# Patient Record
Sex: MALE | Race: ASIAN | NOT HISPANIC OR LATINO | URBAN - METROPOLITAN AREA
[De-identification: names, ages, dates, MRNs, and addresses within clinical notes are randomized per-mention and may not be internally consistent; named-entity substitution may affect disease eponyms.]

---

## 2024-01-17 ENCOUNTER — EMERGENCY (EMERGENCY)
Facility: HOSPITAL | Age: 64
LOS: 1 days | Discharge: HOME | End: 2024-01-17
Attending: STUDENT IN AN ORGANIZED HEALTH CARE EDUCATION/TRAINING PROGRAM | Admitting: STUDENT IN AN ORGANIZED HEALTH CARE EDUCATION/TRAINING PROGRAM
Payer: MEDICAID

## 2024-01-17 VITALS
SYSTOLIC BLOOD PRESSURE: 124 MMHG | HEART RATE: 75 BPM | OXYGEN SATURATION: 99 % | DIASTOLIC BLOOD PRESSURE: 78 MMHG | TEMPERATURE: 98 F | RESPIRATION RATE: 18 BRPM

## 2024-01-17 VITALS
HEART RATE: 77 BPM | TEMPERATURE: 98 F | DIASTOLIC BLOOD PRESSURE: 80 MMHG | OXYGEN SATURATION: 100 % | RESPIRATION RATE: 18 BRPM | SYSTOLIC BLOOD PRESSURE: 164 MMHG

## 2024-01-17 PROCEDURE — 73564 X-RAY EXAM KNEE 4 OR MORE: CPT | Mod: 26,LT

## 2024-01-17 PROCEDURE — 93010 ELECTROCARDIOGRAM REPORT: CPT

## 2024-01-17 PROCEDURE — 73110 X-RAY EXAM OF WRIST: CPT | Mod: 26,RT

## 2024-01-17 PROCEDURE — 70450 CT HEAD/BRAIN W/O DYE: CPT | Mod: 26,MA

## 2024-01-17 PROCEDURE — 99285 EMERGENCY DEPT VISIT HI MDM: CPT

## 2024-01-17 PROCEDURE — 70486 CT MAXILLOFACIAL W/O DYE: CPT | Mod: 26,MA

## 2024-01-17 PROCEDURE — 72125 CT NECK SPINE W/O DYE: CPT | Mod: 26,MA

## 2024-01-17 RX ORDER — ACETAMINOPHEN 500 MG
650 TABLET ORAL ONCE
Refills: 0 | Status: COMPLETED | OUTPATIENT
Start: 2024-01-17 | End: 2024-01-17

## 2024-01-17 RX ADMIN — Medication 650 MILLIGRAM(S): at 12:00

## 2024-01-17 NOTE — ED PROVIDER NOTE - CLINICAL SUMMARY MEDICAL DECISION MAKING FREE TEXT BOX
63-year-old male with past medical history of MI, CAD with stents on aspirin and Plavix, hypertension, presents to ED status post mechanical fall this morning.  Patient states he was walking outside and slipped on ice and fell forward hitting his head on the ground with LOC.  Patient states he was able to get up himself.  Patient states he had a laceration repaired on his forehead at urgent care today and sent to ED for CT and evaluation. Given tetanus shot today at .  Patient admits to mild posterior headache and neck pain, right wrist pain and left knee pain. no midline spinal ttp, no neuro deficits, sutures c/d/i. plan to check ct head, c-spine, ct max/face, right wrist XR and left knee XR.

## 2024-01-17 NOTE — ED ADULT TRIAGE NOTE - CHIEF COMPLAINT QUOTE
Pt c/o slip and fall on ice with head injury. +LOC approx 1 min.  Takes ASA daily. Also c/o BL jaw pain and L knee pain. Arrives with bandage to mid forehead and bandaid to bridge of nose. , bleeding controlled. Denies dizziness, blurry vision, upper/lower back pain, neck pain on palpation.

## 2024-01-17 NOTE — ED ADULT NURSE REASSESSMENT NOTE - NS ED NURSE REASSESS COMMENT FT1
Report Received from RN. Pt resting comfortably in spot 10b. Pt endorses relief of pain at this time. Airway is patent, respirations are even and unlabored. Plan of care ongoing, safety maintained.

## 2024-01-17 NOTE — ED PROVIDER NOTE - NSFOLLOWUPINSTRUCTIONS_ED_ALL_ED_FT
See your primary care doctor within 24-48hours. Bring copies of all of your test results with you. Take Motrin 600mg every 8hrs with food for pain.     Return to urgent care or emergency department for suture removal in 7 days.      If you develop a worsening headache, vision changes, balance problems, loss of sensation in your extremities, severe weakness or any other concerns, return to the emergency room.

## 2024-01-17 NOTE — ED ADULT NURSE NOTE - OBJECTIVE STATEMENT
Patient is a 64 yo male, phx CAD w/ stents, HTN, HLD, presenting with head injury after slip and fall onto ice this morning. AAOx4, no signs of distress but does have a large hematoma to forehead, bleeding controlled at this time, reports LOC, only on aspirin for anticoagulation. Endorsing headache, denies dizziness, blurry vision, nausea. Able to walk. No other complaints. Medicated for pain per orders. Pending CT results. Fall precautions maintained.

## 2024-01-17 NOTE — ED PROVIDER NOTE - CARE PLAN
Principal Discharge DX:	Head trauma  Secondary Diagnosis:	Neck pain  Secondary Diagnosis:	Left knee pain   1

## 2024-01-17 NOTE — ED ADULT NURSE NOTE - NSFALLRISKINTERV_ED_ALL_ED

## 2024-01-17 NOTE — ED PROVIDER NOTE - ATTENDING APP SHARED VISIT CONTRIBUTION OF CARE
I (Law) agree with above, I performed a history and physical. Counseled edmar medical staff, physician assistant, and/or medical student on medical decision making as documented. Medical decisions and treatment interventions were made in real time during the patient encounter. Additionally and/or with the following exceptions: 63-year-old male past medical history coronary artery disease on Plavix, hypertension presents with mechanical fall.  Patient slipped on ice.  Fell forward hit his head.  Did not lose consciousness.  Went to urgent care where he was given a tetanus booster and referred to ED for CT imaging.  Comprehensive trauma exam was positive for abrasion to forehead as well as nose.  There is no laceration to repair.  Also had tenderness with palpation of the right wrist and the left knee.  Given antiplatelet and mechanism of injury patient was ordered for CT imaging of the head, C-spine, max face and x-rays to rule out fracture.  Radiology studies were negative for acute traumatic findings.  Patient is stable for discharge.

## 2024-01-17 NOTE — ED PROVIDER NOTE - ENMT, MLM
Airway patent, Nasal mucosa clear. Mouth with normal mucosa. Throat has no vesicles, no oropharyngeal exudates and uvula is midline. Right forehead laceration repaired 10 sutures C/D/I. No active bleeding. Abrasion to bridge of nose. +b/l jaw ttp and nasal bone ttp. No gross nasal deformity.

## 2024-01-17 NOTE — ED PROVIDER NOTE - PATIENT PORTAL LINK FT
You can access the FollowMyHealth Patient Portal offered by [facility] by registering at the following website: [email or web address]. By joining [facility]’s FollowMyHealth portal, you will also be able to view your health information using other applications (apps) compatible with our system.

## 2024-01-17 NOTE — ED PROVIDER NOTE - OBJECTIVE STATEMENT
63-year-old male with past medical history of MI, CAD with stents on aspirin and Plavix, hypertension, presents to ED status post mechanical fall this morning.  Patient states he was walking outside and slipped on ice and fell forward hitting his head on the ground with LOC.  Patient states he was able to get up himself.  Patient states he had a laceration repaired on his forehead at urgent care today and sent to ED for CT and evaluation. Given tetanus shot today at .  Patient admits to mild posterior headache and neck pain, right wrist pain and left knee pain.  Patient is ambulatory.  Patient denies any chest pain shortness of breath abdominal pain nausea vomiting vision changes dizziness weakness numbness tingling slurred speech facial droop.

## 2024-01-17 NOTE — ED PROVIDER NOTE - NS ED ATTENDING STATEMENT MOD
Detail Level: Detailed This was a shared visit with the CORNELIUS. I reviewed and verified the documentation.

## 2024-01-17 NOTE — ED PROVIDER NOTE - MUSCULOSKELETAL MINIMAL EXAM
no snuffbox R hand ttp. +right medial wrist ttp. full rom. pulse 2+/atraumatic/normal range of motion

## 2024-12-04 PROBLEM — I21.9 ACUTE MYOCARDIAL INFARCTION, UNSPECIFIED: Chronic | Status: ACTIVE | Noted: 2024-01-17

## 2024-12-04 PROBLEM — E78.5 HYPERLIPIDEMIA, UNSPECIFIED: Chronic | Status: ACTIVE | Noted: 2024-01-17

## 2024-12-04 PROBLEM — I10 ESSENTIAL (PRIMARY) HYPERTENSION: Chronic | Status: ACTIVE | Noted: 2024-01-17

## 2024-12-19 ENCOUNTER — NON-APPOINTMENT (OUTPATIENT)
Age: 64
End: 2024-12-19

## 2024-12-19 ENCOUNTER — APPOINTMENT (OUTPATIENT)
Dept: CARDIOLOGY | Facility: CLINIC | Age: 64
End: 2024-12-19
Payer: MEDICAID

## 2024-12-19 ENCOUNTER — INPATIENT (INPATIENT)
Facility: HOSPITAL | Age: 64
LOS: 0 days | Discharge: HOME | DRG: 322 | End: 2024-12-20
Attending: STUDENT IN AN ORGANIZED HEALTH CARE EDUCATION/TRAINING PROGRAM | Admitting: STUDENT IN AN ORGANIZED HEALTH CARE EDUCATION/TRAINING PROGRAM
Payer: MEDICAID

## 2024-12-19 VITALS
BODY MASS INDEX: 26.99 KG/M2 | HEIGHT: 65 IN | HEART RATE: 79 BPM | SYSTOLIC BLOOD PRESSURE: 121 MMHG | WEIGHT: 162 LBS | DIASTOLIC BLOOD PRESSURE: 77 MMHG | OXYGEN SATURATION: 100 %

## 2024-12-19 VITALS
HEIGHT: 65 IN | OXYGEN SATURATION: 98 % | DIASTOLIC BLOOD PRESSURE: 96 MMHG | SYSTOLIC BLOOD PRESSURE: 157 MMHG | RESPIRATION RATE: 20 BRPM | WEIGHT: 162.04 LBS | TEMPERATURE: 98 F | HEART RATE: 91 BPM

## 2024-12-19 DIAGNOSIS — E11.9 TYPE 2 DIABETES MELLITUS W/OUT COMPLICATIONS: ICD-10-CM

## 2024-12-19 DIAGNOSIS — Z78.9 OTHER SPECIFIED HEALTH STATUS: ICD-10-CM

## 2024-12-19 DIAGNOSIS — R53.83 OTHER FATIGUE: ICD-10-CM

## 2024-12-19 DIAGNOSIS — F17.210 NICOTINE DEPENDENCE, CIGARETTES, UNCOMPLICATED: ICD-10-CM

## 2024-12-19 DIAGNOSIS — Z83.3 FAMILY HISTORY OF DIABETES MELLITUS: ICD-10-CM

## 2024-12-19 DIAGNOSIS — E78.5 HYPERLIPIDEMIA, UNSPECIFIED: ICD-10-CM

## 2024-12-19 DIAGNOSIS — R07.9 CHEST PAIN, UNSPECIFIED: ICD-10-CM

## 2024-12-19 DIAGNOSIS — Z83.42 FAMILY HISTORY OF FAMILIAL HYPERCHOLESTEROLEMIA: ICD-10-CM

## 2024-12-19 DIAGNOSIS — I10 ESSENTIAL (PRIMARY) HYPERTENSION: ICD-10-CM

## 2024-12-19 DIAGNOSIS — I25.118 ATHEROSCLEROTIC HEART DISEASE OF NATIVE CORONARY ARTERY WITH OTHER FORMS OF ANGINA PECTORIS: ICD-10-CM

## 2024-12-19 PROBLEM — Z00.00 ENCOUNTER FOR PREVENTIVE HEALTH EXAMINATION: Status: ACTIVE | Noted: 2024-12-19

## 2024-12-19 LAB
ALBUMIN SERPL ELPH-MCNC: 5.5 G/DL — HIGH (ref 3.3–5)
ALP SERPL-CCNC: 56 U/L — SIGNIFICANT CHANGE UP (ref 40–120)
ALT FLD-CCNC: 30 U/L — SIGNIFICANT CHANGE UP (ref 10–45)
ANION GAP SERPL CALC-SCNC: 11 MMOL/L — SIGNIFICANT CHANGE UP (ref 5–17)
APTT BLD: 37.2 SEC — HIGH (ref 24.5–35.6)
AST SERPL-CCNC: 24 U/L — SIGNIFICANT CHANGE UP (ref 10–40)
BASOPHILS # BLD AUTO: 0.07 K/UL — SIGNIFICANT CHANGE UP (ref 0–0.2)
BASOPHILS NFR BLD AUTO: 0.8 % — SIGNIFICANT CHANGE UP (ref 0–2)
BILIRUB SERPL-MCNC: 0.4 MG/DL — SIGNIFICANT CHANGE UP (ref 0.2–1.2)
BUN SERPL-MCNC: 14 MG/DL — SIGNIFICANT CHANGE UP (ref 7–23)
CALCIUM SERPL-MCNC: 10.7 MG/DL — HIGH (ref 8.4–10.5)
CHLORIDE SERPL-SCNC: 105 MMOL/L — SIGNIFICANT CHANGE UP (ref 96–108)
CO2 SERPL-SCNC: 26 MMOL/L — SIGNIFICANT CHANGE UP (ref 22–31)
CREAT SERPL-MCNC: 1.18 MG/DL — SIGNIFICANT CHANGE UP (ref 0.5–1.3)
EGFR: 69 ML/MIN/1.73M2 — SIGNIFICANT CHANGE UP
EOSINOPHIL # BLD AUTO: 0.25 K/UL — SIGNIFICANT CHANGE UP (ref 0–0.5)
EOSINOPHIL NFR BLD AUTO: 2.8 % — SIGNIFICANT CHANGE UP (ref 0–6)
GLUCOSE BLDC GLUCOMTR-MCNC: 140 MG/DL — HIGH (ref 70–99)
GLUCOSE SERPL-MCNC: 104 MG/DL — HIGH (ref 70–99)
HCT VFR BLD CALC: 46.2 % — SIGNIFICANT CHANGE UP (ref 39–50)
HGB BLD-MCNC: 15.3 G/DL — SIGNIFICANT CHANGE UP (ref 13–17)
IMM GRANULOCYTES NFR BLD AUTO: 0.2 % — SIGNIFICANT CHANGE UP (ref 0–0.9)
INR BLD: 0.98 RATIO — SIGNIFICANT CHANGE UP (ref 0.85–1.16)
LYMPHOCYTES # BLD AUTO: 4.13 K/UL — HIGH (ref 1–3.3)
LYMPHOCYTES # BLD AUTO: 45.6 % — HIGH (ref 13–44)
MCHC RBC-ENTMCNC: 30.2 PG — SIGNIFICANT CHANGE UP (ref 27–34)
MCHC RBC-ENTMCNC: 33.1 G/DL — SIGNIFICANT CHANGE UP (ref 32–36)
MCV RBC AUTO: 91.1 FL — SIGNIFICANT CHANGE UP (ref 80–100)
MONOCYTES # BLD AUTO: 0.57 K/UL — SIGNIFICANT CHANGE UP (ref 0–0.9)
MONOCYTES NFR BLD AUTO: 6.3 % — SIGNIFICANT CHANGE UP (ref 2–14)
NEUTROPHILS # BLD AUTO: 4.02 K/UL — SIGNIFICANT CHANGE UP (ref 1.8–7.4)
NEUTROPHILS NFR BLD AUTO: 44.3 % — SIGNIFICANT CHANGE UP (ref 43–77)
NRBC # BLD: 0 /100 WBCS — SIGNIFICANT CHANGE UP (ref 0–0)
NT-PROBNP SERPL-SCNC: <36 PG/ML — SIGNIFICANT CHANGE UP (ref 0–300)
PLATELET # BLD AUTO: 316 K/UL — SIGNIFICANT CHANGE UP (ref 150–400)
POTASSIUM SERPL-MCNC: 4 MMOL/L — SIGNIFICANT CHANGE UP (ref 3.5–5.3)
POTASSIUM SERPL-SCNC: 4 MMOL/L — SIGNIFICANT CHANGE UP (ref 3.5–5.3)
PROT SERPL-MCNC: 8.2 G/DL — SIGNIFICANT CHANGE UP (ref 6–8.3)
PROTHROM AB SERPL-ACNC: 11.3 SEC — SIGNIFICANT CHANGE UP (ref 9.9–13.4)
RBC # BLD: 5.07 M/UL — SIGNIFICANT CHANGE UP (ref 4.2–5.8)
RBC # FLD: 13.8 % — SIGNIFICANT CHANGE UP (ref 10.3–14.5)
SODIUM SERPL-SCNC: 142 MMOL/L — SIGNIFICANT CHANGE UP (ref 135–145)
TROPONIN T, HIGH SENSITIVITY RESULT: 10 NG/L — SIGNIFICANT CHANGE UP (ref 0–51)
WBC # BLD: 9.06 K/UL — SIGNIFICANT CHANGE UP (ref 3.8–10.5)
WBC # FLD AUTO: 9.06 K/UL — SIGNIFICANT CHANGE UP (ref 3.8–10.5)

## 2024-12-19 PROCEDURE — 99291 CRITICAL CARE FIRST HOUR: CPT

## 2024-12-19 PROCEDURE — 99205 OFFICE O/P NEW HI 60 MIN: CPT

## 2024-12-19 PROCEDURE — 99152 MOD SED SAME PHYS/QHP 5/>YRS: CPT

## 2024-12-19 PROCEDURE — 92978 ENDOLUMINL IVUS OCT C 1ST: CPT | Mod: 26,LD

## 2024-12-19 PROCEDURE — 92928 PRQ TCAT PLMT NTRAC ST 1 LES: CPT | Mod: LD

## 2024-12-19 PROCEDURE — 71045 X-RAY EXAM CHEST 1 VIEW: CPT | Mod: 26

## 2024-12-19 PROCEDURE — 99253 IP/OBS CNSLTJ NEW/EST LOW 45: CPT | Mod: GC

## 2024-12-19 PROCEDURE — 93010 ELECTROCARDIOGRAM REPORT: CPT

## 2024-12-19 PROCEDURE — 93458 L HRT ARTERY/VENTRICLE ANGIO: CPT | Mod: 26,59

## 2024-12-19 RX ORDER — AMLODIPINE BESYLATE 2.5 MG/1
2.5 TABLET ORAL
Qty: 90 | Refills: 1 | Status: ACTIVE | COMMUNITY

## 2024-12-19 RX ORDER — METOPROLOL TARTRATE 100 MG/1
25 TABLET, FILM COATED ORAL EVERY 12 HOURS
Refills: 0 | Status: DISCONTINUED | OUTPATIENT
Start: 2024-12-19 | End: 2024-12-20

## 2024-12-19 RX ORDER — CLOPIDOGREL BISULFATE 75 MG/1
75 TABLET, FILM COATED ORAL
Refills: 0 | Status: ACTIVE | COMMUNITY

## 2024-12-19 RX ORDER — FENOFIBRATE 145 MG/1
145 TABLET, COATED ORAL
Qty: 90 | Refills: 1 | Status: ACTIVE | COMMUNITY

## 2024-12-19 RX ORDER — AMLODIPINE BESYLATE 10 MG/1
2.5 TABLET ORAL DAILY
Refills: 0 | Status: DISCONTINUED | OUTPATIENT
Start: 2024-12-19 | End: 2024-12-20

## 2024-12-19 RX ORDER — GLUCAGON INJECTION, SOLUTION 0.5 MG/.1ML
1 INJECTION, SOLUTION SUBCUTANEOUS ONCE
Refills: 0 | Status: DISCONTINUED | OUTPATIENT
Start: 2024-12-19 | End: 2024-12-20

## 2024-12-19 RX ORDER — LISINOPRIL 20 MG/1
5 TABLET ORAL DAILY
Refills: 0 | Status: DISCONTINUED | OUTPATIENT
Start: 2024-12-19 | End: 2024-12-20

## 2024-12-19 RX ORDER — ASPIRIN ENTERIC COATED TABLETS 81 MG 81 MG/1
81 TABLET, DELAYED RELEASE ORAL
Qty: 90 | Refills: 1 | Status: ACTIVE | COMMUNITY

## 2024-12-19 RX ORDER — CLOPIDOGREL 75 MG/1
75 TABLET, FILM COATED ORAL DAILY
Refills: 0 | Status: DISCONTINUED | OUTPATIENT
Start: 2024-12-19 | End: 2024-12-20

## 2024-12-19 RX ORDER — 0.9 % SODIUM CHLORIDE 0.9 %
1000 INTRAVENOUS SOLUTION INTRAVENOUS
Refills: 0 | Status: DISCONTINUED | OUTPATIENT
Start: 2024-12-19 | End: 2024-12-20

## 2024-12-19 RX ORDER — LISINOPRIL 5 MG/1
5 TABLET ORAL DAILY
Refills: 0 | Status: ACTIVE | COMMUNITY

## 2024-12-19 RX ORDER — ATORVASTATIN CALCIUM 80 MG/1
80 TABLET, FILM COATED ORAL
Qty: 90 | Refills: 1 | Status: ACTIVE | COMMUNITY

## 2024-12-19 RX ORDER — METOPROLOL TARTRATE 25 MG/1
25 TABLET ORAL
Qty: 90 | Refills: 0 | Status: ACTIVE | COMMUNITY

## 2024-12-19 RX ORDER — CLOPIDOGREL 75 MG/1
1 TABLET, FILM COATED ORAL
Qty: 90 | Refills: 3
Start: 2024-12-19 | End: 2025-12-13

## 2024-12-19 RX ORDER — UBIDECARENONE/VIT E ACET 100MG-5
25 MCG CAPSULE ORAL
Refills: 0 | Status: ACTIVE | COMMUNITY

## 2024-12-19 RX ORDER — EZETIMIBE 10 MG/1
10 TABLET ORAL
Qty: 90 | Refills: 1 | Status: ACTIVE | COMMUNITY

## 2024-12-19 RX ADMIN — Medication 80 MILLIGRAM(S): at 21:53

## 2024-12-19 RX ADMIN — METOPROLOL TARTRATE 25 MILLIGRAM(S): 100 TABLET, FILM COATED ORAL at 21:53

## 2024-12-19 NOTE — CONSULT NOTE ADULT - SUBJECTIVE AND OBJECTIVE BOX
Cardiology Consult Note  ------------------------------------------------------------------------------------------  SUBJECTIVE:   64 year-old male with PMH HTN, HLD, T2DM, CAD s/p PCI in 2014 for ACS (per patient report, records not available) who presented earlier today as a new patient to cardiology clinic establish care. Was seen by Dr. Pollack. There, he reported gradual onset of exertional chest tightness, shortness of breath and leg claudication starting around 4-5 years ago, which has progressively worsened. About 1 month ago, he would notice symptoms after as little as 1-2 blocks. Symptoms are relieved by rest. He had a resting episode of severe chest discomfort 1 month ago which felt like his prior MI, which prompted him to take a sublingual nitroglycerin. This alleviated his symptoms. Since then patient has not been able to exert himself without discomfort.    In clinic, patient reported a resurgence of the chest tightness (while sitting at rest, /77). He was thereafter referred to the ED. ECG showed normal sinus rhythm without ischemic changes.     Past Medical History:  - HTN  - HLD  - T2DM  - CAD with MI in 2014 at [facility] s/p PCI (records not available at this time, will request records)  ?  Past Surgical History:  - Remote tonsillectomy  ?  Family History:  - Mom had diabetes  ?  Social History:  - 2-3 cigarettes/day (down from 1 pack/day)  - No EtOH  - No other substance  ?  Medications: As below  Allergies: NKDA  ?  Labs reviewed, notable for Cr 1.3, K 4.2, total cholesterol 163, LDL cholesterol 93, HDL cholesterol 35, HGB 13.4    -------------------------------------------------------------------------------------------  VS:  T(F): 97.8 (12-19), Max: 97.8 (12-19)  HR: 91 (12-19) (91 - 91)  BP: 157/96 (12-19) (157/96 - 157/96)  RR: 20 (12-19)  SpO2: 98% (12-19)  I&O's Summary    PHYSICAL EXAM:  GENERAL: NAD  HEAD: Atraumatic, Normocephalic.  ENT: Moist mucous membranes.  NECK: Supple, No JVD.  CHEST/LUNG: Clear to auscultation bilaterally; No rales, rhonchi, wheezing, or rubs. Unlabored respirations.  HEART: Regular rate and rhythm; No murmurs, rubs, or gallops.  ABDOMEN: Bowel sounds present; Soft, Nontender, Nondistended.   EXTREMITIES: No edema. 2+ Peripheral Pulses, brisk capillary refill. No clubbing or cyanosis.     -------------------------------------------------------------------------------------------

## 2024-12-19 NOTE — ED ADULT NURSE NOTE - OBJECTIVE STATEMENT
Pt is a 64y M PMH HTN, HLD, T2DM, CAD w/ stent on aspirin on plavix sent in from clinic for cp. Pt has been having progressive, worsening intermittent cp, sob on exertion. Pt denies fever, chills, cough, NVD, abd pain, urinary symptoms.  Pt is well appearing, A&Ox4, breathing unlabored and spontaneous, moves all extremities. Pt placed on cardiac monitor, IV access obtained. Pt placed in gown, resting in stretcher, bed in lowest position, aware of plan of care. Comfort and safety measures.

## 2024-12-19 NOTE — DISCHARGE NOTE PROVIDER - NSDCCPCAREPLAN_GEN_ALL_CORE_FT
PRINCIPAL DISCHARGE DIAGNOSIS  Diagnosis: Chest pain  Assessment and Plan of Treatment: Coronary artery disease is a condition where the arteries the supply the heart muscle get clogged with fatty deposits & puts you at risk for a heart attack. You underwent left heart catheterization and one stent was placed to Diag 1.   Call your doctor if you have any new pain, pressure, or discomfort in the center of your chest, pain, tingling or discomfort in arms, back, neck, jaw, or stomach, shortness of breath, nausea, vomiting, burping or heartburn, sweating, cold and clammy skin, racing or abnormal heartbeat for more than 10 minutes or if they keep coming & going.  Call 911 and do not tr to get to hospital by care  You can help yourself with lifestyle changes (quitting smoking if you smoke), eat lots of fruits & vegetables & low fat dairy products, not a lot of meat & fatty foods, walk or some form of physical activity most days of the week, lose weight if you are overweight  Take your cardiac medication as prescribed to lower cholesterol, to lower blood pressure, aspirin to prevent blood clots, and diabetes control  Make sure to keep appointments with doctor for cardiac follow up care        SECONDARY DISCHARGE DIAGNOSES  Diagnosis: HLD (hyperlipidemia)  Assessment and Plan of Treatment: Cholesterol is a substance that is found in the blood. Everyone has some. It is needed for good health. The problem is, people sometimes have too much cholesterol. Compared with people with normal cholesterol, people with high cholesterol have a higher risk of heart attack, stroke, and other health problems. The higher your cholesterol, the higher your risk of these problems.  Not everyone who has high cholesterol needs medicines. Your doctor will decide if you need them based on your age, family history, and other health concerns.  The medicines most often used to treat high cholesterol are called "statins."  You should probably take a statin if you:   - Already had a heart attack or stroke   - Have known heart disease   - Have diabetes   - Have a condition called" peripheral artery disease," which makes it painful to walk, and happens when the arteries in your legs get clogged with fatty deposits   - Have an "abdominal aortic aneurysm," which is a widening of the main artery in the belly  Most people with any of the conditions listed above should take a statin no matter what their cholesterol level is.  If your doctor or nurse prescribes a statin, it's important to keep taking it. The medicine might not make you feel any different. But it can help prevent heart attack, stroke, and death  You can help lower your cholesterol by doing these things:   - You can lower your LDL, or "bad," cholesterol by avoiding red meat, butter, fried foods, cheese, and other foods that have a lot of saturated fat.   - You can lower triglycerides by avoiding sugary foods, fried foods, and excess alcohol.   - If you are overweight, it can help to lose weight. Your doctor or nurse can help you do this in a healthy way.   - Try to get regular physical activity. Even gentle forms of exercise, like walking, are good for your health.  Even if these steps do little to change your cholesterol, they can improve your health in many other ways

## 2024-12-19 NOTE — DISCHARGE NOTE PROVIDER - HOSPITAL COURSE
64 year-old male with PMH HTN, HLD, T2DM, CAD s/p PCI in 2014 for ACS (per patient report, records not available) who presented to outpatient cardiologist c/o class II-III anginal symptoms, now referred to [facility] for cardiac catheterization. Now s/p cardiac catheterization on 12/19/24 revealing 90% obstructive CAD in D1 s/p 1 SHAW via RRA. Post-procedure, radial band removed per protocol w/o any complications; site stable -- soft, nontender, no hematoma. Continue ASA 81mg qd, Plavix 75mg qd, Atorvastatin 80mg qhs). Denies any complaints at this time. Patient remained hemodynamically stable, neurovascularly intact and chest pain free post-procedure. Remained overnight for observation and pain control. Patient has been medically cleared for discharge as per Dr. Dominguez. Patient has been given appropriate discharge instructions including medication regimen, access site management and follow up. Medications that patient needs refills on (+/- new medications) have been e-prescribed to preferred pharmacy. Patient will f/u with Dr. Pollack in 1-2 weeks for further management.     Cardiac Rehab (STEMI/NSTEMI/ACS/Unstable Angina/CHF/Chronic Stable Angina/Heart Surgery (CABG,Valve)/Post PCI):              *Education on benefits of Cardiac Rehab provided to patient: Yes         *Referral and Prescription Given for Cardiac Rehab : Yes         *Pt given list of locations & instructed to contact their insurance company to review list of participating providers: Yes         *Pt instructed to bring Cardiac Rehab prescription with them to Cardiology Follow up appointment for assistance with enrollment: Yes         *Pt discharged with copies detail cardiovascular history, medications, testing/treatments: Yes

## 2024-12-19 NOTE — CONSULT NOTE ADULT - ATTENDING COMMENTS
64 year-old male with PMH HTN, HLD, T2DM, CAD s/p PCI in 2014 for ACS (per patient report, records not available) who presented earlier today as a new patient to cardiology clinic establish care. In clinic, patient reported a resurgence of the chest tightness (while sitting at rest, /77). He was thereafter referred to the ED. ECG showed normal sinus rhythm without ischemic changes.    Found to have unstable angina - awaiting cardiac cath  resume home meds including the DAPT, high dose statin, BB/Ace inh

## 2024-12-19 NOTE — H&P CARDIOLOGY - HISTORY OF PRESENT ILLNESS
64 year-old male with PMH HTN, HLD, T2DM, CAD s/p PCI in 2014 for ACS (per patient report, records not available) who presented earlier today as a new patient to cardiology clinic establish care. Was seen by Dr. Pollack. There, he reported gradual onset of exertional chest tightness, shortness of breath and leg claudication starting around 4-5 years ago, which has progressively worsened. About 1 month ago, he would notice symptoms after as little as 1-2 blocks. Symptoms are relieved by rest. He had a resting episode of severe chest discomfort 1 month ago which felt like his prior MI, which prompted him to take a sublingual nitroglycerin. This alleviated his symptoms. Since then patient has not been able to exert himself without discomfort.    In clinic, patient reported a resurgence of the chest tightness (while sitting at rest, /77). He was thereafter referred to the ED. ECG showed normal sinus rhythm without ischemic changes.

## 2024-12-19 NOTE — ED ADULT NURSE NOTE - NS PRO PASSIVE SMOKE EXP
Detail Level: Detailed
Quality 110: Preventive Care And Screening: Influenza Immunization: Influenza Immunization not Administered because Patient Refused.
Unknown

## 2024-12-19 NOTE — ED ADULT TRIAGE NOTE - PAIN: PRESENCE, MLM
chest/complains of pain/discomfort Oxybutynin Counseling:  I discussed with the patient the risks of oxybutynin including but not limited to skin rash, drowsiness, dry mouth, difficulty urinating, and blurred vision.

## 2024-12-19 NOTE — CONSULT NOTE ADULT - ASSESSMENT
64 year-old male with PMH HTN, HLD, T2DM, CAD s/p PCI in 2014 for ACS (per patient report, records not available) who presented earlier today as a new patient to cardiology clinic establish care. In clinic, patient reported a resurgence of the chest tightness (while sitting at rest, /77). He was thereafter referred to the ED. ECG showed normal sinus rhythm without ischemic changes.    #Unstable Angina  - Patient added on for C today  - Please obtain pre-procedural labs (CBC, CMP, coags, type+screen)  - Resume home ASA 81 mg tomorrow  - Resume home plavix 75 mg tomorrow  - Resume home amlodipine 2.5 mg tomorrow  - Resume home metoprolol tartrate 25 mg BID  - Resume home atorvastatin 80 mg tomorrow  - Resume home lisinopril 5 mg tomorrow  - Will follow up post-cath

## 2024-12-19 NOTE — ED ADULT NURSE NOTE - NSFALLUNIVINTERV_ED_ALL_ED
Bed/Stretcher in lowest position, wheels locked, appropriate side rails in place/Call bell, personal items and telephone in reach/Instruct patient to call for assistance before getting out of bed/chair/stretcher/Non-slip footwear applied when patient is off stretcher/[place] to call system/Physically safe environment - no spills, clutter or unnecessary equipment/Purposeful proactive rounding/Room/bathroom lighting operational, light cord in reach

## 2024-12-19 NOTE — DISCHARGE NOTE PROVIDER - CARE PROVIDER_API CALL
Kaity Pollack  Cardiovascular Disease  [address] [place] [id number]  Phone: [phone]  Fax: [phone]  Follow Up Time: 2 weeks

## 2024-12-19 NOTE — DISCHARGE NOTE PROVIDER - NSDCMRMEDTOKEN_GEN_ALL_CORE_FT
amLODIPine 2.5 mg oral tablet: 1 tab(s) orally once a day  aspirin 81 mg oral delayed release tablet: 1 tab(s) orally once a day  atorvastatin 80 mg oral tablet: 1 tab(s) orally once a day  lisinopril 5 mg oral tablet: 1 tab(s) orally once a day  metoprolol tartrate 25 mg oral tablet: 1 tab(s) orally every 12 hours  Plavix 75 mg oral tablet: 1 tab(s) orally once a day

## 2024-12-19 NOTE — ED PROVIDER NOTE - CLINICAL SUMMARY MEDICAL DECISION MAKING FREE TEXT BOX
65 yo M w/ PMHx of HTN, HLD, MI s/p stent (2014) on aspirin/Plavix, and A-fib? on metoprolol presents for 5 to 6 months of intermittent CP/SOB that is worse with walking.  Over the past 2 months, patient reports that he is unable to walk more than 1–2 blocks.  He presented to outpatient cardiologist office (Dr. Pollack) at [facility] today was asked to present to the ED for expedited disposition to catheter lab.  He denies any fever/chills/cough/sore throat, SOB, abdominal pain, /GI symptoms.  He reports that he had shingles in 2019 and still has superficial pain in the left abdomen.  He denies any history of blood clots in legs/lungs, cancer, or strokes.    Vital signs remarkable for /79. EKG shows normal sinus rhythm with no ischemic changes. Physical exam is unremarkable -normal heart/lung sounds, abdomen soft nontender, no pitting edema in lower extremities.  Concern for stable angina that might require catheter intervention per cardiology team.  Plan for labs and admission to cardiology. 63 yo M w/ PMHx of HTN, HLD, MI s/p stent (2014) on aspirin/Plavix, and A-fib? on metoprolol presents for 5 to 6 months of intermittent CP/SOB that is worse with walking.  Over the past 2 months, patient reports that he is unable to walk more than 1–2 blocks.  He presented to outpatient cardiologist office (Dr. Pollack) at [facility] today was asked to present to the ED for expedited disposition to catheter lab.  He denies any fever/chills/cough/sore throat, SOB, abdominal pain, /GI symptoms.  He reports that he had shingles in 2019 and still has superficial pain in the left abdomen.  He denies any history of blood clots in legs/lungs, cancer, or strokes.    Vital signs remarkable for /79. EKG shows normal sinus rhythm with no ischemic changes. Physical exam is unremarkable -normal heart/lung sounds, abdomen soft nontender, no pitting edema in lower extremities.  Concern for stable angina that might require catheter intervention per cardiology team.  Plan for labs and admission to cardiology.    ELIGIO Hooper MD: Agree with resident/ACP MDM, assessment and plan as above.

## 2024-12-20 ENCOUNTER — TRANSCRIPTION ENCOUNTER (OUTPATIENT)
Age: 64
End: 2024-12-20

## 2024-12-20 VITALS
RESPIRATION RATE: 17 BRPM | SYSTOLIC BLOOD PRESSURE: 122 MMHG | OXYGEN SATURATION: 97 % | HEART RATE: 86 BPM | DIASTOLIC BLOOD PRESSURE: 69 MMHG

## 2024-12-20 PROCEDURE — 71045 X-RAY EXAM CHEST 1 VIEW: CPT

## 2024-12-20 PROCEDURE — 99291 CRITICAL CARE FIRST HOUR: CPT

## 2024-12-20 PROCEDURE — C1887: CPT

## 2024-12-20 PROCEDURE — 85610 PROTHROMBIN TIME: CPT

## 2024-12-20 PROCEDURE — C9600: CPT | Mod: LD

## 2024-12-20 PROCEDURE — C1874: CPT

## 2024-12-20 PROCEDURE — C1769: CPT

## 2024-12-20 PROCEDURE — 93005 ELECTROCARDIOGRAM TRACING: CPT

## 2024-12-20 PROCEDURE — C1894: CPT

## 2024-12-20 PROCEDURE — C1725: CPT

## 2024-12-20 PROCEDURE — 84484 ASSAY OF TROPONIN QUANT: CPT

## 2024-12-20 PROCEDURE — C1753: CPT

## 2024-12-20 PROCEDURE — 85025 COMPLETE CBC W/AUTO DIFF WBC: CPT

## 2024-12-20 PROCEDURE — 85730 THROMBOPLASTIN TIME PARTIAL: CPT

## 2024-12-20 PROCEDURE — 99232 SBSQ HOSP IP/OBS MODERATE 35: CPT

## 2024-12-20 PROCEDURE — 36415 COLL VENOUS BLD VENIPUNCTURE: CPT

## 2024-12-20 PROCEDURE — 82962 GLUCOSE BLOOD TEST: CPT

## 2024-12-20 PROCEDURE — 83880 ASSAY OF NATRIURETIC PEPTIDE: CPT

## 2024-12-20 PROCEDURE — 93458 L HRT ARTERY/VENTRICLE ANGIO: CPT | Mod: 59

## 2024-12-20 PROCEDURE — 92978 ENDOLUMINL IVUS OCT C 1ST: CPT | Mod: LD

## 2024-12-20 PROCEDURE — 80053 COMPREHEN METABOLIC PANEL: CPT

## 2024-12-20 RX ORDER — AMLODIPINE BESYLATE 10 MG/1
1 TABLET ORAL
Refills: 0 | DISCHARGE

## 2024-12-20 RX ORDER — CLOPIDOGREL 75 MG/1
1 TABLET, FILM COATED ORAL
Refills: 0 | DISCHARGE

## 2024-12-20 RX ORDER — LISINOPRIL 20 MG/1
1 TABLET ORAL
Refills: 0 | DISCHARGE

## 2024-12-20 RX ORDER — METOPROLOL TARTRATE 100 MG/1
1 TABLET, FILM COATED ORAL
Refills: 0 | DISCHARGE

## 2024-12-20 RX ADMIN — LISINOPRIL 5 MILLIGRAM(S): 20 TABLET ORAL at 05:55

## 2024-12-20 RX ADMIN — Medication 81 MILLIGRAM(S): at 05:54

## 2024-12-20 RX ADMIN — AMLODIPINE BESYLATE 2.5 MILLIGRAM(S): 10 TABLET ORAL at 05:55

## 2024-12-20 RX ADMIN — CLOPIDOGREL 75 MILLIGRAM(S): 75 TABLET, FILM COATED ORAL at 05:54

## 2024-12-20 NOTE — PROGRESS NOTE ADULT - ASSESSMENT
64M with PMH of HTN, HLD, T2DM, CAD s/p PCI is now s/p [facility] with PCI    # CAD   s/p [facility] - 1 SHAW to D1 via RRA with Dr. Dominguez    - Right radial access site is stable without bleeding, or hematoma.    - Continue close monitoring of radial access site and clinical status     - Continue Aspirin and Plavix     - Keep K > 4 and Mg > 2    # HTN    - Normotensive    - Continue Amlodipine 2.5mg QD, Lisinopril 5mg QD, Lopressor 25mg BID    - DASH diet    # HLD    - Continue Lipitor 80mg daily    - DASH diet     # DM2    - Continue SSI while inpatient    - FS TID/HS    - Continue consistent carb diet         # Education on DAPT and Post cath precautions     - Reviewed and reinforced with patient:  wound care instructions, activities dos and donts, medication compliance specifically antiplatelet therapy given stent/s.      - Patient aware to take DAPT as prescribed and DO NOT STOP taking without consulting cardiologist first or STENT/s WILL CLOSE    - Reviewed and reinforced with patient:  site complications (eg: bleeding, excruciating pain at the procedural site, large swelling (golf ball sized), extremity numbness, tingling, temperature change), or CHEST PAIN; pt aware that if any of those occur he/she must call cardiologist IMMEDIATELY or 911 or go to nearest emergency room    - Reviewed and reinforced a heart healthy diet, Smoking Cessation   - Recommends heart healthy diet which includes a variety of fruits and vegetables, whole grains, low fat dairy products, legumes and skinless poultry and fish; food prepared with little or no salt and minimize processed foods   - Avoid using NSAIDs  (Aleve, Motrin, ibuprofen, naproxen) while on DAPT, please utilize Tylenol for pain control (not to exceed 4gm in 24 hours)   - Patient verbalizes understanding of ALL OF THE ABOVE, and gives positive feedback     # Dispo   - Likely D/C in AM    - Patient to follow up with Cards in 2 weeks post discharge

## 2024-12-20 NOTE — DISCHARGE NOTE NURSING/CASE MANAGEMENT/SOCIAL WORK - FINANCIAL ASSISTANCE
[facility] provides services at a reduced cost to those who are determined to be eligible through [facility]’s financial assistance program. Information regarding [facility]’s financial assistance program can be found by going to [email or web address] or by calling [phone].

## 2024-12-20 NOTE — PROGRESS NOTE ADULT - SUBJECTIVE AND OBJECTIVE BOX
[facility] INVASIVE CARDIOLOGY- (Rivas, Elvira, Alta, Tammy, Olivier, Raymundo, Alberto, Hair, En)   CARDIAC [facility], [facility] TEAM   [phone]      CHIEF COMPLAINT: Patient is a 64y old  Male who presents with a chief complaint of   HPI:  64 year-old male with PMH HTN, HLD, T2DM, CAD s/p PCI in 2014 for ACS (per patient report, records not available) who presented earlier today as a new patient to cardiology clinic establish care. Was seen by Dr. Pollack. There, he reported gradual onset of exertional chest tightness, shortness of breath and leg claudication starting around 4-5 years ago, which has progressively worsened. About 1 month ago, he would notice symptoms after as little as 1-2 blocks. Symptoms are relieved by rest. He had a resting episode of severe chest discomfort 1 month ago which felt like his prior MI, which prompted him to take a sublingual nitroglycerin. This alleviated his symptoms. Since then patient has not been able to exert himself without discomfort.    In clinic, patient reported a resurgence of the chest tightness (while sitting at rest, /77). He was thereafter referred to the ED. ECG showed normal sinus rhythm without ischemic changes.        (19 Dec 2024 16:54)      Subjective/Observations:   Patient feels well - no current complaints- Denies chest pain, SOB, palpitation, N/V  No pain, numbness, tingling or swelling around the access site    MEDICATIONS  (STANDING):  amLODIPine   Tablet 2.5 milliGRAM(s) Oral daily  aspirin enteric coated 81 milliGRAM(s) Oral daily  atorvastatin 80 milliGRAM(s) Oral at bedtime  clopidogrel Tablet 75 milliGRAM(s) Oral daily  dextrose 5%. 1000 milliLiter(s) (50 mL/Hr) IV Continuous <Continuous>  dextrose 5%. 1000 milliLiter(s) (100 mL/Hr) IV Continuous <Continuous>  dextrose 50% Injectable 25 Gram(s) IV Push once  dextrose 50% Injectable 12.5 Gram(s) IV Push once  dextrose 50% Injectable 25 Gram(s) IV Push once  glucagon  Injectable 1 milliGRAM(s) IntraMuscular once  insulin lispro (ADMELOG) corrective regimen sliding scale   SubCutaneous three times a day before meals  insulin lispro (ADMELOG) corrective regimen sliding scale   SubCutaneous at bedtime  lisinopril 5 milliGRAM(s) Oral daily  metoprolol tartrate 25 milliGRAM(s) Oral every 12 hours    MEDICATIONS  (PRN):  dextrose Oral Gel 15 Gram(s) Oral once PRN Blood Glucose LESS THAN 70 milliGRAM(s)/deciliter      Allergies    No Known Allergies    Intolerances        Vital Signs Last 24 Hrs  T(C): 36.6 (20 Dec 2024 00:00), Max: 36.7 (19 Dec 2024 19:55)  T(F): 97.9 (20 Dec 2024 00:00), Max: 98 (19 Dec 2024 19:55)  HR: 76 (20 Dec 2024 00:00) (70 - 91)  BP: 129/66 (20 Dec 2024 00:00) (129/66 - 160/86)  BP(mean): 89 (20 Dec 2024 00:00) (89 - 110)  RR: 17 (20 Dec 2024 01:00) (15 - 20)  SpO2: 96% (20 Dec 2024 00:00) (95% - 100%)    Parameters below as of 20 Dec 2024 01:00  Patient On (Oxygen Delivery Method): room air        FOCUSED PHYSICAL EXAM:  Cardiovascular: Regular, S1 and S2, No murmurs, rubs, gallops or clicks  Pulmonary: Non-labored, breath sounds are clear bilaterally, No wheezing, rales or rhonchi  cath site: Right radial access site - stable without edema, bleeding or hematoma. soft, + pulses     LABS: All Labs Reviewed:                        15.3   9.06  )-----------( 316      ( 19 Dec 2024 16:38 )             46.2     19 Dec 2024 16:38    142    |  105    |  14     ----------------------------<  104    4.0     |  26     |  1.18     Ca    10.7       19 Dec 2024 16:38    TPro  8.2    /  Alb  5.5    /  TBili  0.4    /  DBili  x      /  AST  24     /  ALT  30     /  AlkPhos  56     19 Dec 2024 16:38    PT/INR - ( 19 Dec 2024 16:38 )   PT: 11.3 sec;   INR: 0.98 ratio         PTT - ( 19 Dec 2024 16:38 )  PTT:37.2 sec          RESULTS:     CXR  < from: Xray Chest 1 View- PORTABLE-Routine (Xray Chest 1 View- PORTABLE-Routine .) (12.19.24 @ 17:00) >  IMPRESSION:  No focal consolidations.    < end of copied text >

## 2024-12-20 NOTE — DISCHARGE NOTE NURSING/CASE MANAGEMENT/SOCIAL WORK - NSDCPEFALRISK_GEN_ALL_CORE
For information on Fall & Injury Prevention, visit: [email or web address] OR  [email or web address] OR  https://www.cdc.gov/steadi/patient.html

## 2024-12-20 NOTE — DISCHARGE NOTE NURSING/CASE MANAGEMENT/SOCIAL WORK - PATIENT PORTAL LINK FT
You can access the FollowMyHealth Patient Portal offered by [facility] by registering at the following website: [email or web address]. By joining [employer]’s FollowMyHealth portal, you will also be able to view your health information using other applications (apps) compatible with our system.

## 2025-01-16 ENCOUNTER — NON-APPOINTMENT (OUTPATIENT)
Age: 65
End: 2025-01-16

## 2025-01-16 ENCOUNTER — APPOINTMENT (OUTPATIENT)
Dept: CARDIOLOGY | Facility: CLINIC | Age: 65
End: 2025-01-16
Payer: MEDICAID

## 2025-01-16 VITALS
HEIGHT: 65 IN | SYSTOLIC BLOOD PRESSURE: 118 MMHG | DIASTOLIC BLOOD PRESSURE: 74 MMHG | WEIGHT: 165 LBS | BODY MASS INDEX: 27.49 KG/M2 | HEART RATE: 79 BPM | OXYGEN SATURATION: 97 %

## 2025-01-16 DIAGNOSIS — I25.110 ATHEROSCLEROTIC HEART DISEASE OF NATIVE CORONARY ARTERY WITH UNSTABLE ANGINA PECTORIS: ICD-10-CM

## 2025-01-16 DIAGNOSIS — I73.9 PERIPHERAL VASCULAR DISEASE, UNSPECIFIED: ICD-10-CM

## 2025-01-16 PROCEDURE — 99214 OFFICE O/P EST MOD 30 MIN: CPT

## 2025-01-16 RX ORDER — METOPROLOL SUCCINATE 25 MG/1
25 TABLET, EXTENDED RELEASE ORAL DAILY
Qty: 90 | Refills: 3 | Status: ACTIVE | COMMUNITY
Start: 2025-01-16 | End: 1900-01-01

## 2025-01-16 RX ORDER — METFORMIN HYDROCHLORIDE 500 MG/1
500 TABLET, COATED ORAL DAILY
Qty: 90 | Refills: 0 | Status: ACTIVE | COMMUNITY
Start: 2025-01-16

## 2025-01-16 RX ORDER — EMPAGLIFLOZIN 10 MG/1
10 TABLET, FILM COATED ORAL DAILY
Qty: 90 | Refills: 3 | Status: ACTIVE | COMMUNITY
Start: 2025-01-16

## 2025-02-04 ENCOUNTER — APPOINTMENT (OUTPATIENT)
Dept: CARDIOLOGY | Facility: CLINIC | Age: 65
End: 2025-02-04

## 2025-02-04 ENCOUNTER — NON-APPOINTMENT (OUTPATIENT)
Age: 65
End: 2025-02-04

## 2025-02-11 ENCOUNTER — APPOINTMENT (OUTPATIENT)
Dept: CARDIOLOGY | Facility: CLINIC | Age: 65
End: 2025-02-11
Payer: MEDICAID

## 2025-02-11 ENCOUNTER — NON-APPOINTMENT (OUTPATIENT)
Age: 65
End: 2025-02-11

## 2025-02-11 VITALS
HEART RATE: 82 BPM | WEIGHT: 158 LBS | DIASTOLIC BLOOD PRESSURE: 70 MMHG | BODY MASS INDEX: 26.29 KG/M2 | SYSTOLIC BLOOD PRESSURE: 111 MMHG | OXYGEN SATURATION: 98 %

## 2025-02-11 DIAGNOSIS — E78.5 HYPERLIPIDEMIA, UNSPECIFIED: ICD-10-CM

## 2025-02-11 DIAGNOSIS — I25.118 ATHEROSCLEROTIC HEART DISEASE OF NATIVE CORONARY ARTERY WITH OTHER FORMS OF ANGINA PECTORIS: ICD-10-CM

## 2025-02-11 DIAGNOSIS — Z00.00 ENCOUNTER FOR GENERAL ADULT MEDICAL EXAMINATION W/OUT ABNORMAL FINDINGS: ICD-10-CM

## 2025-02-11 DIAGNOSIS — E11.9 TYPE 2 DIABETES MELLITUS W/OUT COMPLICATIONS: ICD-10-CM

## 2025-02-11 PROCEDURE — 99214 OFFICE O/P EST MOD 30 MIN: CPT | Mod: 25

## 2025-02-11 PROCEDURE — 93798 PHYS/QHP OP CAR RHAB W/ECG: CPT

## 2025-03-12 ENCOUNTER — APPOINTMENT (OUTPATIENT)
Dept: CARDIOLOGY | Facility: CLINIC | Age: 65
End: 2025-03-12

## 2025-03-13 ENCOUNTER — APPOINTMENT (OUTPATIENT)
Dept: CARDIOLOGY | Facility: CLINIC | Age: 65
End: 2025-03-13

## 2025-03-17 ENCOUNTER — APPOINTMENT (OUTPATIENT)
Dept: CARDIOLOGY | Facility: CLINIC | Age: 65
End: 2025-03-17

## 2025-03-19 ENCOUNTER — APPOINTMENT (OUTPATIENT)
Dept: CARDIOLOGY | Facility: CLINIC | Age: 65
End: 2025-03-19

## 2025-03-20 ENCOUNTER — APPOINTMENT (OUTPATIENT)
Dept: CARDIOLOGY | Facility: CLINIC | Age: 65
End: 2025-03-20

## 2025-03-24 ENCOUNTER — APPOINTMENT (OUTPATIENT)
Dept: CARDIOLOGY | Facility: CLINIC | Age: 65
End: 2025-03-24

## 2025-03-26 ENCOUNTER — APPOINTMENT (OUTPATIENT)
Dept: CARDIOLOGY | Facility: CLINIC | Age: 65
End: 2025-03-26

## 2025-03-27 ENCOUNTER — APPOINTMENT (OUTPATIENT)
Dept: CARDIOLOGY | Facility: CLINIC | Age: 65
End: 2025-03-27

## 2025-03-27 ENCOUNTER — APPOINTMENT (OUTPATIENT)
Dept: CV DIAGNOSITCS | Facility: HOSPITAL | Age: 65
End: 2025-03-27

## 2025-03-31 ENCOUNTER — APPOINTMENT (OUTPATIENT)
Dept: CARDIOLOGY | Facility: CLINIC | Age: 65
End: 2025-03-31

## 2025-04-02 ENCOUNTER — APPOINTMENT (OUTPATIENT)
Dept: CARDIOLOGY | Facility: CLINIC | Age: 65
End: 2025-04-02

## 2025-04-03 ENCOUNTER — APPOINTMENT (OUTPATIENT)
Dept: CARDIOLOGY | Facility: CLINIC | Age: 65
End: 2025-04-03

## 2025-04-07 ENCOUNTER — APPOINTMENT (OUTPATIENT)
Dept: CARDIOLOGY | Facility: CLINIC | Age: 65
End: 2025-04-07

## 2025-04-09 ENCOUNTER — APPOINTMENT (OUTPATIENT)
Dept: CARDIOLOGY | Facility: CLINIC | Age: 65
End: 2025-04-09

## 2025-04-10 ENCOUNTER — APPOINTMENT (OUTPATIENT)
Dept: CARDIOLOGY | Facility: CLINIC | Age: 65
End: 2025-04-10

## 2025-04-14 ENCOUNTER — APPOINTMENT (OUTPATIENT)
Dept: CARDIOLOGY | Facility: CLINIC | Age: 65
End: 2025-04-14

## 2025-04-16 ENCOUNTER — APPOINTMENT (OUTPATIENT)
Dept: CARDIOLOGY | Facility: CLINIC | Age: 65
End: 2025-04-16

## 2025-04-17 ENCOUNTER — APPOINTMENT (OUTPATIENT)
Dept: CARDIOLOGY | Facility: CLINIC | Age: 65
End: 2025-04-17

## 2025-04-21 ENCOUNTER — APPOINTMENT (OUTPATIENT)
Dept: CARDIOLOGY | Facility: CLINIC | Age: 65
End: 2025-04-21

## 2025-04-23 ENCOUNTER — APPOINTMENT (OUTPATIENT)
Dept: CARDIOLOGY | Facility: CLINIC | Age: 65
End: 2025-04-23

## 2025-04-24 ENCOUNTER — APPOINTMENT (OUTPATIENT)
Dept: CARDIOLOGY | Facility: CLINIC | Age: 65
End: 2025-04-24

## 2025-04-28 ENCOUNTER — APPOINTMENT (OUTPATIENT)
Dept: CARDIOLOGY | Facility: CLINIC | Age: 65
End: 2025-04-28

## 2025-04-30 ENCOUNTER — APPOINTMENT (OUTPATIENT)
Dept: CARDIOLOGY | Facility: CLINIC | Age: 65
End: 2025-04-30

## 2025-05-01 ENCOUNTER — APPOINTMENT (OUTPATIENT)
Dept: CARDIOLOGY | Facility: CLINIC | Age: 65
End: 2025-05-01

## 2025-05-05 ENCOUNTER — APPOINTMENT (OUTPATIENT)
Dept: CARDIOLOGY | Facility: CLINIC | Age: 65
End: 2025-05-05

## 2025-05-07 ENCOUNTER — APPOINTMENT (OUTPATIENT)
Dept: CARDIOLOGY | Facility: CLINIC | Age: 65
End: 2025-05-07

## 2025-05-08 ENCOUNTER — APPOINTMENT (OUTPATIENT)
Dept: CARDIOLOGY | Facility: CLINIC | Age: 65
End: 2025-05-08

## 2025-05-12 ENCOUNTER — APPOINTMENT (OUTPATIENT)
Dept: CARDIOLOGY | Facility: CLINIC | Age: 65
End: 2025-05-12

## 2025-05-14 ENCOUNTER — APPOINTMENT (OUTPATIENT)
Dept: CARDIOLOGY | Facility: CLINIC | Age: 65
End: 2025-05-14

## 2025-05-15 ENCOUNTER — APPOINTMENT (OUTPATIENT)
Dept: CARDIOLOGY | Facility: CLINIC | Age: 65
End: 2025-05-15

## 2025-05-19 ENCOUNTER — APPOINTMENT (OUTPATIENT)
Dept: CARDIOLOGY | Facility: CLINIC | Age: 65
End: 2025-05-19

## 2025-05-21 ENCOUNTER — APPOINTMENT (OUTPATIENT)
Dept: CARDIOLOGY | Facility: CLINIC | Age: 65
End: 2025-05-21

## 2025-05-22 ENCOUNTER — APPOINTMENT (OUTPATIENT)
Dept: CARDIOLOGY | Facility: CLINIC | Age: 65
End: 2025-05-22

## 2025-05-28 ENCOUNTER — APPOINTMENT (OUTPATIENT)
Dept: CARDIOLOGY | Facility: CLINIC | Age: 65
End: 2025-05-28

## 2025-05-29 ENCOUNTER — APPOINTMENT (OUTPATIENT)
Dept: CARDIOLOGY | Facility: CLINIC | Age: 65
End: 2025-05-29